# Patient Record
Sex: MALE | Race: WHITE | NOT HISPANIC OR LATINO | Employment: STUDENT | URBAN - NONMETROPOLITAN AREA
[De-identification: names, ages, dates, MRNs, and addresses within clinical notes are randomized per-mention and may not be internally consistent; named-entity substitution may affect disease eponyms.]

---
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
## 2020-03-11 ENCOUNTER — HEALTH MAINTENANCE LETTER (OUTPATIENT)
Age: 20
End: 2020-03-11

## 2021-01-03 ENCOUNTER — HEALTH MAINTENANCE LETTER (OUTPATIENT)
Age: 21
End: 2021-01-03

## 2021-01-22 ENCOUNTER — MYC MEDICAL ADVICE (OUTPATIENT)
Dept: GERIATRICS | Facility: OTHER | Age: 21
End: 2021-01-22

## 2021-04-25 ENCOUNTER — HEALTH MAINTENANCE LETTER (OUTPATIENT)
Age: 21
End: 2021-04-25

## 2021-10-09 ENCOUNTER — HEALTH MAINTENANCE LETTER (OUTPATIENT)
Age: 21
End: 2021-10-09

## 2022-02-15 ENCOUNTER — TELEPHONE (OUTPATIENT)
Dept: BEHAVIORAL HEALTH | Facility: CLINIC | Age: 22
End: 2022-02-15

## 2022-02-18 ENCOUNTER — TELEPHONE (OUTPATIENT)
Dept: BEHAVIORAL HEALTH | Facility: CLINIC | Age: 22
End: 2022-02-18
Payer: COMMERCIAL

## 2022-02-24 ENCOUNTER — TELEPHONE (OUTPATIENT)
Dept: BEHAVIORAL HEALTH | Facility: CLINIC | Age: 22
End: 2022-02-24
Payer: COMMERCIAL

## 2022-03-02 ENCOUNTER — TELEPHONE (OUTPATIENT)
Dept: BEHAVIORAL HEALTH | Facility: CLINIC | Age: 22
End: 2022-03-02
Payer: COMMERCIAL

## 2022-03-04 ENCOUNTER — TELEPHONE (OUTPATIENT)
Dept: BEHAVIORAL HEALTH | Facility: CLINIC | Age: 22
End: 2022-03-04

## 2022-03-04 ENCOUNTER — HOSPITAL ENCOUNTER (OUTPATIENT)
Dept: BEHAVIORAL HEALTH | Facility: CLINIC | Age: 22
End: 2022-03-04
Attending: FAMILY MEDICINE
Payer: COMMERCIAL

## 2022-03-04 PROCEDURE — 999N000216 HC STATISTIC ADULT CD FACE TO FACE-NO CHRG: Mod: GT | Performed by: COUNSELOR

## 2022-03-04 ASSESSMENT — COLUMBIA-SUICIDE SEVERITY RATING SCALE - C-SSRS
6. HAVE YOU EVER DONE ANYTHING, STARTED TO DO ANYTHING, OR PREPARED TO DO ANYTHING TO END YOUR LIFE?: NO
5. HAVE YOU STARTED TO WORK OUT OR WORKED OUT THE DETAILS OF HOW TO KILL YOURSELF? DO YOU INTEND TO CARRY OUT THIS PLAN?: NO
2. HAVE YOU ACTUALLY HAD ANY THOUGHTS OF KILLING YOURSELF IN THE PAST MONTH?: NO
3. HAVE YOU BEEN THINKING ABOUT HOW YOU MIGHT KILL YOURSELF?: YES
1. IN THE PAST MONTH, HAVE YOU WISHED YOU WERE DEAD OR WISHED YOU COULD GO TO SLEEP AND NOT WAKE UP?: NO
4. HAVE YOU HAD THESE THOUGHTS AND HAD SOME INTENTION OF ACTING ON THEM?: NO

## 2022-03-04 ASSESSMENT — ANXIETY QUESTIONNAIRES
4. TROUBLE RELAXING: NEARLY EVERY DAY
GAD7 TOTAL SCORE: 19
6. BECOMING EASILY ANNOYED OR IRRITABLE: MORE THAN HALF THE DAYS
3. WORRYING TOO MUCH ABOUT DIFFERENT THINGS: NEARLY EVERY DAY
7. FEELING AFRAID AS IF SOMETHING AWFUL MIGHT HAPPEN: MORE THAN HALF THE DAYS
2. NOT BEING ABLE TO STOP OR CONTROL WORRYING: NEARLY EVERY DAY
5. BEING SO RESTLESS THAT IT IS HARD TO SIT STILL: NEARLY EVERY DAY
7. FEELING AFRAID AS IF SOMETHING AWFUL MIGHT HAPPEN: MORE THAN HALF THE DAYS
1. FEELING NERVOUS, ANXIOUS, OR ON EDGE: NEARLY EVERY DAY

## 2022-03-04 NOTE — PROGRESS NOTES
"[facility]    ADULT Mental Health Assessment Appointment Note    PATIENT'S NAME:  Steve Fitzpatrick    Preferred Pronoun: He/him  MRN: [id number]  YOB: 2000  Address:  [address]  [facility] [address]  PREFERRED PHONE: [phone]  May we leave a referral related message: Yes    DATE OF SERVICE: 3/04/22  START TIME: 8:34am   END TIME: 9:06am   SERVICE MODALITY:  Video Visit:      Provider verified identity through the following two step process.  Patient provided:  Patient  and Patient address    Telemedicine Visit: The patient's condition can be safely assessed and treated via synchronous audio and visual telemedicine encounter.      Reason for Telemedicine Visit: Services only offered telehealth    Originating Site (Patient Location): Patient's home    Distant Site (Provider Location): [facility] MENT[facility] HE[facility]    Consent:  The patient/guardian has verbally consented to: the potential risks and benefits of telemedicine (video visit) versus in person care; bill my insurance or make self-payment for services provided; and responsibility for payment of non-covered services.     Patient would like the video invitation sent by:  My Chart    Mode of Communication:  Video Conference via [employer]    As the provider I attest to compliance with applicable laws and regulations related to telemedicine.    Identifying Information:  Patient is a 21 year old, .  Patient was referred for an assessment by self.  Patient attended the session alone. Patient identified their preferred language to be English. Patient reported they does not need the assistance of an  or other support involved in therapy.     Services Requested:   The reason for seeking services at this time is: \" To get a therapist and psychiatrist \"  Patient has attempted to resolve these concerns in the past.  Patient does not have legal " concerns.        Current Mental Status Exam:   Appearance:  Appropriate    Eye Contact:  Good   Psychomotor:  Normal   Attitude / Demeanor: Cooperative   Speech Rate:  Normal/ Responsive  Volume:  Normal  volume  Language:  intact and no problems  Mood:   Normal  Affect:   Appropriate    Thought Content: Clear   Thought Process: Coherent     Associations:  No loosening of associations  Insight:   Good   Judgment:  Intact   Orientation:  All  Attention:  Good    Rating Scales:  PHQ9:    PHQ-9 SCORE 11/21/2018   PHQ-9 Total Score 12   ;    GAD7:    JHONNY-7 SCORE 11/21/2018 3/4/2022   Total Score - 19 (severe anxiety)   Total Score 7 19       Safety Assessment:   Current Safety Concerns:  [place] Suicide Severity Rating Scale (Lifetime/Recent)  [place] Suicide Severity Rating (Lifetime/Recent) 3/4/2022   Q1 Wished to be Dead (Past Month) no   Q2 Suicidal Thoughts (Past Month) no   Q3 Suicidal Thought Method yes   Q4 Suicidal Intent without Specific Plan no   Q5 Suicide Intent with Specific Plan no   Q6 Suicide Behavior (Lifetime) no   Level of Risk per Screen low risk     Patient denies current homicidal ideation and behaviors.  Patient denies current self-injurious ideation and behaviors.    Patient denied risk behaviors associated with substance use.  Patient reported impulsive/compulsive spending behaviors associated with mental health symptoms.  Patient reports the following current concerns for their personal safety: None.  Patient reports there are  firearms in the house. The firearms are secured in a locked space.     Clinical Impressions: By history  Generalized Anxiety Disorder  A. Excessive anxiety and worry about a number of events or activities (such as work or school performance).   B. The person finds it difficult to control the worry.   - Restlessness or feeling keyed up or on edge.    - Being easily fatigued.    - Difficulty concentrating or mind going blank.    - Irritability.    - Muscle tension.    -  Sleep disturbance (difficulty falling or staying asleep, or restless unsatisfying sleep).   D. The focus of the anxiety and worry is not confined to features of an Axis I disorder.  E. The anxiety, worry, or physical symptoms cause clinically significant distress or impairment in social, occupational, or other important areas of functioning.  Major Depressive Disorder   - Depressed mood. Note: In children and adolescents, can be irritable mood.     - Diminished interest or pleasure in all, or almost all, activities.    - Fatigue or loss of energy.    - Diminished ability to think or concentrate, or indecisiveness.    - Recurrent thoughts of death (not just fear of dying), recurrent suicidal ideation without a specific plan, or a suicide attempt or a specific plan for committing suicide.   B) The symptoms cause clinically significant distress or impairment in social, occupational, or other important areas of functioning  D) The occurence of major depressive episode is not better explained by other thought / psychotic disorders  E) There has never been a manic episode or hypomanic episode    Therapeutic Interventions Provided: supportive active listening and explored alternatives    Recommendations/Plan: Pt states that he would like therapy and psychiatry. Clinician explained assessment to pt. Pt states that he has done this in the past and declined the assessment.     Referrals:   Date: Monday, 3/7/2022    Time: 2:40 pm - 4:00 pm  Provider: Mathew GONZALES PA-C  Location: [address] [address]  Phone: [phone]  Type: Telepsychiatry    Patient Instructions  Please call the office for intake paperwork and scheduling at [phone]. All paperwork needs to be completed prior to scheduling. Once paperwork is received, appointment date and time will be confirmed and information on how to connect for the telehealth appointment will be given.    Date: 3/23/2022  Time:  12:30  Provider: Tabby Dobson  Location: [facility]  Phone: [phone]  Type: Teletherapy     Date: 3/29/2022  Time: 12:30  Provider: Tabby Dobson  Location: [facility]  Phone: [phone]  Type: Teletherapy     Date: 04/05/2022  Time: 12:30  Provider: Tabby Dobson  Location: [facility]  Phone: [phone]  Type: Teletherapy     Date: 04/12/2022  Time: 12:30  Provider: Tabby Dobson  Location: [facility]  Phone: [phone]  Type: Teletherapy         Aidan Shook [facility],  March 4, 2022  M[facility] Addiction [facility]

## 2022-03-04 NOTE — PATIENT INSTRUCTIONS
Referrals:   Date: Monday, 3/7/2022    Time: 2:40 pm - 4:00 pm  Provider: Mathew GONZALES PA-C  Location: [facility],  [address] [place] [address]  Phone: [phone]  Type: Telepsychiatry    Patient Instructions  Please call the office for intake paperwork and scheduling at [phone]. All paperwork needs to be completed prior to scheduling. Once paperwork is received, appointment date and time will be confirmed and information on how to connect for the telehealth appointment will be given.    Date: 3/23/2022  Time: 12:30  Provider: Tabby Dobson  Location: [facility]  Phone: [phone]  Type: Teletherapy     Date: 3/29/2022  Time: 12:30  Provider: Tabby Dobson  Location: [facility]  Phone: [phone]  Type: Teletherapy     Date: 04/05/2022  Time: 12:30  Provider: Tabby Dobson  Location: [facility]  Phone: [phone]  Type: Teletherapy     Date: 04/12/2022  Time: 12:30  Provider: Tabby Dobson  Location: [facility]  Phone: [phone]  Type: Teletherapy

## 2022-03-04 NOTE — TELEPHONE ENCOUNTER
Patient have a video appointment today at 8:30am with the [facility].  placed a phone call this morning to check in patient for video appt. Unable to get a hold of patient. Writer left a vm with writer's call back number.

## 2022-03-05 ASSESSMENT — ANXIETY QUESTIONNAIRES: GAD7 TOTAL SCORE: 19

## 2022-05-15 ENCOUNTER — HOSPITAL ENCOUNTER (EMERGENCY)
Facility: OTHER | Age: 22
Discharge: HOME | End: 2022-05-15
Attending: PHYSICIAN ASSISTANT | Admitting: PHYSICIAN ASSISTANT
Payer: COMMERCIAL

## 2022-05-15 ENCOUNTER — APPOINTMENT (OUTPATIENT)
Dept: GENERAL RADIOLOGY | Facility: OTHER | Age: 22
End: 2022-05-15
Attending: PHYSICIAN ASSISTANT
Payer: COMMERCIAL

## 2022-05-15 VITALS
BODY MASS INDEX: 23.1 KG/M2 | DIASTOLIC BLOOD PRESSURE: 46 MMHG | SYSTOLIC BLOOD PRESSURE: 93 MMHG | WEIGHT: 165 LBS | HEART RATE: 67 BPM | HEIGHT: 71 IN | RESPIRATION RATE: 18 BRPM | OXYGEN SATURATION: 97 %

## 2022-05-15 DIAGNOSIS — S93.402A SPRAIN OF LEFT ANKLE, UNSPECIFIED LIGAMENT, INITIAL ENCOUNTER: ICD-10-CM

## 2022-05-15 PROCEDURE — 99283 EMERGENCY DEPT VISIT LOW MDM: CPT | Mod: 25 | Performed by: PHYSICIAN ASSISTANT

## 2022-05-15 PROCEDURE — 29515 APPLICATION SHORT LEG SPLINT: CPT | Mod: LT | Performed by: PHYSICIAN ASSISTANT

## 2022-05-15 PROCEDURE — 73610 X-RAY EXAM OF ANKLE: CPT | Mod: TC,LT

## 2022-05-15 RX ORDER — HYDROCODONE BITARTRATE AND ACETAMINOPHEN 5; 325 MG/1; MG/1
1 TABLET ORAL EVERY 6 HOURS PRN
Qty: 6 TABLET | Refills: 0 | Status: SHIPPED | OUTPATIENT
Start: 2022-05-15

## 2022-05-16 NOTE — ED TRIAGE NOTES
[place]. Pt c/o Lt ankle pain with swelling noted. CMS intact. Pt was given 25mcg of Fentanyl via IV by EMS     Triage Assessment     Row Name 05/15/22 2052       Triage Assessment (Adult)    Airway WDL WDL       Respiratory WDL    Respiratory WDL WDL       Skin Circulation/Temperature WDL    Skin Circulation/Temperature WDL WDL       Cardiac WDL    Cardiac WDL WDL       Peripheral/Neurovascular WDL    Peripheral Neurovascular WDL WDL       Cognitive/Neuro/Behavioral WDL    Cognitive/Neuro/Behavioral WDL WDL

## 2022-05-16 NOTE — ED PROVIDER NOTES
"  History     Chief Complaint   Patient presents with     Ankle Pain     HPI  Steve Fitzpatrick is a 21 year old male who presents to the emergency department for evaluation after falling off his long board injuring his left ankle.  Does have some lateral medial swelling.  He does not have any pain proximally.  He did not fall and hit his head no loss of consciousness GCS 15 no neck pain chest pain shortness of breath no abdominal pain or constipation no loss bowel bladder function or other injuries.  He is alert and oriented answers questions appropriately.  His pain is mild in nature    Allergies:  No Known Allergies    Problem List:    Patient Active Problem List    Diagnosis Date Noted     Obesity 08/22/2011     Priority: Medium        Past Medical History:    No past medical history on file.    Past Surgical History:    Past Surgical History:   Procedure Laterality Date     CIRCUMCISION INFANT      No Comments Provided       Family History:    No family history on file.    Social History:  Marital Status:  Single [1]  Social History     Tobacco Use     Smoking status: Never Smoker     Smokeless tobacco: Never Used   Substance Use Topics     Alcohol use: No     Drug use: Unknown     Types: Other     Comment: Drug use: No        Medications:    HYDROcodone-acetaminophen (NORCO) 5-325 MG tablet  azithromycin (ZITHROMAX) 250 MG tablet          Review of Systems   Please see HPI for pertinent positives and negatives.  All other systems reviewed and found to be negative.      Physical Exam   BP: 93/46  Pulse: 67  Resp: 18  Height: 180.3 cm (5' 11\")  Weight: 74.8 kg (165 lb)  SpO2: 97 %      Physical Exam   Exam:  Constitutional: healthy, alert and no distress  Head: Normocephalic.    Neck: Neck supple.    Cardiovascular: RRR. No murmurs, clicks gallops or rub  Respiratory:  Lungs clear  Musculoskeletal: Internal and external rotation of the left hip is intact axial loading is intact.  Valgus stress anterior posterior " drawer sign Mel's examination of left knee is intact compression test of the tib-fib syndesmosis no pain proximally.  The left ankle inversion eversion anterior posterior drawer sign not evaluated secondary to the patient's lateral medial swelling and some discomfort.  The metatarsals are otherwise unremarkable CMS is intact Homans and Felder's test negative.  No open lesions areas of cellulitis or blisters identified.  Skin: no suspicious lesions or rashes as noted above  Neurologic:Reflexes normal and symmetric. Sensation grossly WNL.  Psychiatric: mentation appears normal and affect normal/bright       ED Course                 [facility]    -Fracture    Date/Time: 5/15/2022 9:48 PM  Performed by: Geoffrey Duncan PA-C  Authorized by: Geoffrey Duncan PA-C     Risks, benefits and alternatives discussed.      INJURY      Injury location:  Ankle    Ankle injury location:  L ankle    Ankle fracture type: lateral malleolus      PRE PROCEDURE ASSESSMENT      Neurological function: normal      Distal perfusion: normal      Range of motion: reduced      PROCEDURE DETAILS:     Immobilization:  Splint    Splint type: Posterior splint with stirrup.    Supplies used:  Plaster    POST PROCEDURE ASSESSMENT      Neurological function: normal      Distal perfusion: normal      Range of motion: unchanged        PROCEDURE    Patient Tolerance:  Patient tolerated the procedure well with no immediate complications                    Results for orders placed or performed during the hospital encounter of 05/15/22 (from the past 24 hour(s))   XR Ankle Port Left G/E 3 Views    Narrative    PROCEDURE INFORMATION:   Exam: XR Left Ankle   Exam date and time: 5/15/2022 8:59 PM   Age: 21 years old   Clinical indication: Injury or trauma; Fall; Blunt trauma; Ankle; Left     TECHNIQUE:   Imaging protocol: XR Left ankle.   Views: 3 or more views.     COMPARISON:   No relevant prior studies available.      FINDINGS:   Bones/joints: Normal.   Soft tissues: Mild swelling over the lateral malleolus.       Impression    IMPRESSION:   1. No fracture or subluxation.   2. Mild swelling over lateral malleolus.     THIS DOCUMENT HAS BEEN ELECTRONICALLY SIGNED BY MIYA NOLAN MD       Medications - No data to display    Assessments & Plan (with Medical Decision Making)     I have reviewed the nursing notes.    I have reviewed the findings, diagnosis, plan and need for follow up with the patient.  Differential diagnosis considerations included strain, sprain, fracture, contusion, dislocation, internal ligament derangement, disk herniation-radiculopathy, arthritis, bursitis, tendonitis, DVT, vascular occlusion, claudication, compartment syndrome, cellulitis.    Pleasant gentleman who presents for evaluation he was on a long board. He had fallen off a long board injuring his left ankle.  Imaging was obtained no acute fracture identified.  Patient did have a splint that was applied.  Splint applied by myself and staff members.  The patient's pre and post splint the patient is neurovascular functions grossly intact.  Encouraged him ice elevate rest close follow-up with orthopedics use crutches precautions discussed.  I explained my diagnostic considerations and recommendations and the patient voiced an understanding and was in agreement with the treatment plan. All questions were answered. We discussed potential side effects of any prescribed or recommended therapies, as well as expectations for response to treatments.      New Prescriptions    HYDROCODONE-ACETAMINOPHEN (NORCO) 5-325 MG TABLET    Take 1 tablet by mouth every 6 hours as needed for severe pain       Final diagnoses:   Sprain of left ankle, unspecified ligament, initial encounter       5/15/2022   [facility]     Geoffrey Duncan PA-C  05/15/22 [id number]

## 2022-05-21 ENCOUNTER — HEALTH MAINTENANCE LETTER (OUTPATIENT)
Age: 22
End: 2022-05-21

## 2022-09-17 ENCOUNTER — HEALTH MAINTENANCE LETTER (OUTPATIENT)
Age: 22
End: 2022-09-17

## 2023-06-04 ENCOUNTER — HEALTH MAINTENANCE LETTER (OUTPATIENT)
Age: 23
End: 2023-06-04

## 2024-07-13 ENCOUNTER — HEALTH MAINTENANCE LETTER (OUTPATIENT)
Age: 24
End: 2024-07-13